# Patient Record
Sex: MALE | Race: WHITE | Employment: FULL TIME | URBAN - METROPOLITAN AREA
[De-identification: names, ages, dates, MRNs, and addresses within clinical notes are randomized per-mention and may not be internally consistent; named-entity substitution may affect disease eponyms.]

---

## 2023-01-15 ENCOUNTER — HOSPITAL ENCOUNTER (EMERGENCY)
Age: 23
Discharge: HOME | End: 2023-01-15
Payer: COMMERCIAL

## 2023-01-15 VITALS
BODY MASS INDEX: 21.67 KG/M2 | RESPIRATION RATE: 16 BRPM | TEMPERATURE: 98.1 F | WEIGHT: 160 LBS | HEIGHT: 72 IN | DIASTOLIC BLOOD PRESSURE: 78 MMHG | HEART RATE: 81 BPM | OXYGEN SATURATION: 98 % | SYSTOLIC BLOOD PRESSURE: 114 MMHG

## 2023-01-15 DIAGNOSIS — R11.2 NAUSEA AND VOMITING, UNSPECIFIED VOMITING TYPE: Primary | ICD-10-CM

## 2023-01-15 LAB
A/G RATIO: 1.6 (ref 1.1–2.2)
ALBUMIN SERPL-MCNC: 4.9 G/DL (ref 3.4–5)
ALP BLD-CCNC: 75 U/L (ref 40–129)
ALT SERPL-CCNC: 39 U/L (ref 10–40)
ANION GAP SERPL CALCULATED.3IONS-SCNC: 9 MMOL/L (ref 3–16)
AST SERPL-CCNC: 30 U/L (ref 15–37)
BASOPHILS ABSOLUTE: 0 K/UL (ref 0–0.2)
BASOPHILS RELATIVE PERCENT: 0.2 %
BILIRUB SERPL-MCNC: 0.9 MG/DL (ref 0–1)
BILIRUBIN URINE: NEGATIVE
BLOOD, URINE: NEGATIVE
BUN BLDV-MCNC: 19 MG/DL (ref 7–20)
CALCIUM SERPL-MCNC: 9.1 MG/DL (ref 8.3–10.6)
CHLORIDE BLD-SCNC: 103 MMOL/L (ref 99–110)
CLARITY: CLEAR
CO2: 25 MMOL/L (ref 21–32)
COLOR: YELLOW
CREAT SERPL-MCNC: 0.8 MG/DL (ref 0.9–1.3)
EOSINOPHILS ABSOLUTE: 0.2 K/UL (ref 0–0.6)
EOSINOPHILS RELATIVE PERCENT: 1.8 %
GFR SERPL CREATININE-BSD FRML MDRD: >60 ML/MIN/{1.73_M2}
GLUCOSE BLD-MCNC: 121 MG/DL (ref 70–99)
GLUCOSE URINE: NEGATIVE MG/DL
HCT VFR BLD CALC: 47.8 % (ref 40.5–52.5)
HEMOGLOBIN: 15.6 G/DL (ref 13.5–17.5)
KETONES, URINE: NEGATIVE MG/DL
LEUKOCYTE ESTERASE, URINE: NEGATIVE
LIPASE: 18 U/L (ref 13–60)
LYMPHOCYTES ABSOLUTE: 0.6 K/UL (ref 1–5.1)
LYMPHOCYTES RELATIVE PERCENT: 4.1 %
MCH RBC QN AUTO: 30 PG (ref 26–34)
MCHC RBC AUTO-ENTMCNC: 32.7 G/DL (ref 31–36)
MCV RBC AUTO: 91.7 FL (ref 80–100)
MICROSCOPIC EXAMINATION: NORMAL
MONOCYTES ABSOLUTE: 0.5 K/UL (ref 0–1.3)
MONOCYTES RELATIVE PERCENT: 4 %
NEUTROPHILS ABSOLUTE: 12.2 K/UL (ref 1.7–7.7)
NEUTROPHILS RELATIVE PERCENT: 89.9 %
NITRITE, URINE: NEGATIVE
PDW BLD-RTO: 13.4 % (ref 12.4–15.4)
PH UA: 6.5 (ref 5–8)
PLATELET # BLD: 122 K/UL (ref 135–450)
PMV BLD AUTO: 8.9 FL (ref 5–10.5)
POTASSIUM REFLEX MAGNESIUM: 4.1 MMOL/L (ref 3.5–5.1)
PROTEIN UA: NEGATIVE MG/DL
RBC # BLD: 5.21 M/UL (ref 4.2–5.9)
RBC UA: NORMAL /HPF (ref 0–4)
SODIUM BLD-SCNC: 137 MMOL/L (ref 136–145)
SPECIFIC GRAVITY UA: 1.02 (ref 1–1.03)
TOTAL PROTEIN: 7.9 G/DL (ref 6.4–8.2)
URINE TYPE: NORMAL
UROBILINOGEN, URINE: 0.2 E.U./DL
WBC # BLD: 13.6 K/UL (ref 4–11)
WBC UA: NORMAL /HPF (ref 0–5)

## 2023-01-15 PROCEDURE — 96374 THER/PROPH/DIAG INJ IV PUSH: CPT

## 2023-01-15 PROCEDURE — 83690 ASSAY OF LIPASE: CPT

## 2023-01-15 PROCEDURE — 2580000003 HC RX 258: Performed by: NURSE PRACTITIONER

## 2023-01-15 PROCEDURE — 85025 COMPLETE CBC W/AUTO DIFF WBC: CPT

## 2023-01-15 PROCEDURE — 36415 COLL VENOUS BLD VENIPUNCTURE: CPT

## 2023-01-15 PROCEDURE — 6360000002 HC RX W HCPCS: Performed by: NURSE PRACTITIONER

## 2023-01-15 PROCEDURE — 99284 EMERGENCY DEPT VISIT MOD MDM: CPT

## 2023-01-15 PROCEDURE — 81001 URINALYSIS AUTO W/SCOPE: CPT

## 2023-01-15 PROCEDURE — 80053 COMPREHEN METABOLIC PANEL: CPT

## 2023-01-15 RX ORDER — ONDANSETRON 2 MG/ML
4 INJECTION INTRAMUSCULAR; INTRAVENOUS EVERY 6 HOURS PRN
Status: DISCONTINUED | OUTPATIENT
Start: 2023-01-15 | End: 2023-01-15 | Stop reason: HOSPADM

## 2023-01-15 RX ORDER — 0.9 % SODIUM CHLORIDE 0.9 %
1000 INTRAVENOUS SOLUTION INTRAVENOUS ONCE
Status: COMPLETED | OUTPATIENT
Start: 2023-01-15 | End: 2023-01-15

## 2023-01-15 RX ORDER — SODIUM CHLORIDE 9 MG/ML
1000 INJECTION, SOLUTION INTRAVENOUS CONTINUOUS
Status: DISCONTINUED | OUTPATIENT
Start: 2023-01-15 | End: 2023-01-15

## 2023-01-15 RX ORDER — ONDANSETRON 4 MG/1
4 TABLET, FILM COATED ORAL EVERY 8 HOURS PRN
Qty: 6 TABLET | Refills: 0 | Status: SHIPPED | OUTPATIENT
Start: 2023-01-15 | End: 2023-01-17

## 2023-01-15 RX ADMIN — ONDANSETRON HYDROCHLORIDE 4 MG: 2 INJECTION, SOLUTION INTRAMUSCULAR; INTRAVENOUS at 10:45

## 2023-01-15 RX ADMIN — SODIUM CHLORIDE 1000 ML: 9 INJECTION, SOLUTION INTRAVENOUS at 10:46

## 2023-01-15 ASSESSMENT — PAIN SCALES - GENERAL
PAINLEVEL_OUTOF10: 3
PAINLEVEL_OUTOF10: 3
PAINLEVEL_OUTOF10: 4
PAINLEVEL_OUTOF10: 1

## 2023-01-15 ASSESSMENT — ENCOUNTER SYMPTOMS
SORE THROAT: 0
NAUSEA: 1
COUGH: 0
BLOOD IN STOOL: 0
BACK PAIN: 0
RHINORRHEA: 0
SHORTNESS OF BREATH: 0
ABDOMINAL PAIN: 0
VOMITING: 1
EYE PAIN: 0
DIARRHEA: 1

## 2023-01-15 ASSESSMENT — PAIN DESCRIPTION - LOCATION: LOCATION: ABDOMEN

## 2023-01-15 ASSESSMENT — PAIN DESCRIPTION - ORIENTATION: ORIENTATION: MID

## 2023-01-15 NOTE — ED PROVIDER NOTES
[address] ED  EMERGENCY DEPARTMENT ENCOUNTER        Pt Name: Manny Brothers  MRN: [id number]  [place] 2000  Date of evaluation: 1/15/2023  Provider: JUSTICE Rivers CNP  PCP: No primary care provider on file. Note Started: 10:38 AM EST 1/15/23      MARK. I have evaluated this patient. My supervising physician was available for consultation. CHIEF COMPLAINT       Chief Complaint   Patient presents with    Emesis     Patient reports vomiting and diarrhea since 7am this morning. Patient believes he has food poisoning from pizza he ate last night. HISTORY OF PRESENT ILLNESS: 1 or more Elements     History From: Patient  Limitations to history : None    Manny Brothers is a 25 y.o. male who presents to the emergency department symptoms of nausea and vomiting. Patient reported symptoms of nausea vomiting began this morning around 7 AM.  Patient states that symptoms of vomiting of been reported as initial vomiting of food like symptoms is now just dry heaves. Upon arrival to the emergency department IV was placed and he was provided a single dose of Zofran. Patient has not vomited since his single dose of Zofran. We will continue to monitor. He has no symptoms of neck pain or back pain. No abdominal pain. Denies any dysuria. He reported an episode of diarrhea. Nursing Notes were all reviewed and agreed with or any disagreements were addressed in the HPI. REVIEW OF SYSTEMS :      Review of Systems   Constitutional:  Negative for chills, diaphoresis and fever. HENT:  Negative for congestion, ear pain, rhinorrhea and sore throat. Eyes:  Negative for pain and visual disturbance. Respiratory:  Negative for cough and shortness of breath. Cardiovascular:  Negative for chest pain and leg swelling. Gastrointestinal:  Positive for diarrhea, nausea and vomiting. Negative for abdominal pain and blood in stool.    Genitourinary:  Negative for difficulty urinating, dysuria, flank pain and frequency. Musculoskeletal:  Negative for back pain and neck pain. Skin:  Negative for rash and wound. Neurological:  Negative for dizziness and light-headedness. Positives and Pertinent negatives as per HPI. SURGICAL HISTORY   History reviewed. No pertinent surgical history. CURRENTMEDICATIONS       Previous Medications    No medications on file       ALLERGIES     Peanut-containing drug products    FAMILYHISTORY     History reviewed. No pertinent family history. SOCIAL HISTORY       Social History     Tobacco Use    Smoking status: Never   Substance Use Topics    Alcohol use: Yes     Comment: biweekly    Drug use: Yes     Types: Marijuana (Weed)     Comment: occasional       SCREENINGS        Ra Coma Scale  Eye Opening: Spontaneous  Best Verbal Response: None  Best Motor Response: Obeys commands  Ra Coma Scale Score: 15                CIWA Assessment  BP: 124/81  Heart Rate: 87           PHYSICAL EXAM  1 or more Elements     ED Triage Vitals   BP Temp Temp src Heart Rate Resp SpO2 Height Weight   01/15/23 0956 -- -- 01/15/23 0956 01/15/23 0959 01/15/23 0956 01/15/23 0956 01/15/23 0956   124/81   87 18 97 % 6' (1.829 m) 160 lb (72.6 kg)       Physical Exam  Vitals and nursing note reviewed. Constitutional:       Appearance: Normal appearance. He is not toxic-appearing or diaphoretic. HENT:      Head: Normocephalic and atraumatic. Nose: Nose normal.   Eyes:      General:         Right eye: No discharge. Left eye: No discharge. Cardiovascular:      Rate and Rhythm: Normal rate and regular rhythm. Pulses: Normal pulses. Heart sounds: No murmur heard. Pulmonary:      Effort: Pulmonary effort is normal. No respiratory distress. Breath sounds: No wheezing or rhonchi. Abdominal:      Palpations: Abdomen is soft. Tenderness: There is no abdominal tenderness. There is no guarding or rebound.    Musculoskeletal: General: Normal range of motion.      Cervical back: Normal range of motion and neck supple.      Right lower leg: No edema.      Left lower leg: No edema.   Skin:     General: Skin is warm and dry.   Neurological:      General: No focal deficit present.      Mental Status: He is alert and oriented to person, place, and time.   Psychiatric:         Mood and Affect: Mood normal.         Behavior: Behavior normal.           DIAGNOSTIC RESULTS   LABS:    Labs Reviewed   CBC WITH AUTO DIFFERENTIAL - Abnormal; Notable for the following components:       Result Value    WBC 13.6 (*)     Platelets 122 (*)     Neutrophils Absolute 12.2 (*)     Lymphocytes Absolute 0.6 (*)     All other components within normal limits   COMPREHENSIVE METABOLIC PANEL W/ REFLEX TO MG FOR LOW K - Abnormal; Notable for the following components:    Glucose 121 (*)     Creatinine 0.8 (*)     All other components within normal limits   LIPASE   URINALYSIS WITH MICROSCOPIC       When ordered only abnormal lab results are displayed. All other labs were within normal range or not returned as of this dictation.    EKG: When ordered, EKG's are interpreted by the Emergency Department Physician in the absence of a cardiologist.  Please see their note for interpretation of EKG.    RADIOLOGY:   Non-plain film images such as CT, Ultrasound and MRI are read by the radiologist. Plain radiographic images are visualized and preliminarily interpreted by the ED Provider with the below findings:        Interpretation per the Radiologist below, if available at the time of this note:    No orders to display     No results found.    No results found.    PROCEDURES   Unless otherwise noted below, none     Procedures    CRITICAL CARE TIME (.cctime)       PAST MEDICAL HISTORY      has no past medical history on file.     EMERGENCY DEPARTMENT COURSE and DIFFERENTIAL DIAGNOSIS/MDM:   Vitals:    Vitals:    01/15/23 0956 01/15/23 0959   BP: 124/81    Pulse: 87    Resp:  18  SpO2: 97%    Weight: 160 lb (72.6 kg)    Height: 6' (1.829 m)        Patient was given the following medications:  Medications   ondansetron (ZOFRAN) injection 4 mg (4 mg IntraVENous Given 1/15/23 1045)   0.9 % sodium chloride bolus (0 mLs IntraVENous Stopped 1/15/23 1138)             Is this patient to be included in the SEP-1 Core Measure due to severe sepsis or septic shock? No   Exclusion criteria - the patient is NOT to be included for SEP-1 Core Measure due to:  2+ SIRS criteria are not met    Chronic Conditions affecting care:    has no past medical history on file. CONSULTS: (Who and What was discussed)  None      Social Determinants : None    Records Reviewed (Source):     CC/HPI Summary, DDx, ED Course, and Reassessment: At this time patient well-appearing here in the emergency department. We will go and plan for discharge. Patient provided Zofran for home use. Patient's had improvement in symptoms with Zofran. In the meantime patient's general abdominal exam is benign. No tenderness on palpation. Was also noted that his general laboratory findings were quite unremarkable. Minimal elevation in his white blood cell count but otherwise normal-appearing. Patient be discharged home condition. Disposition Considerations (tests considered but not done, Admit vs D/C, Shared Decision Making, Pt Expectation of Test or Tx.): Patient be discharged home in good condition. Patient appears well nontoxic. No acute complaints noted at this time. Encouraged return follow-up to the ER if he has any concerning symptoms. Also advised to follow-up family doctor next few days. I am the Primary Clinician of Record. FINAL IMPRESSION      1.  Nausea and vomiting, unspecified vomiting type          DISPOSITION/PLAN     DISPOSITION Discharge - Pending Orders Complete 01/15/2023 12:12:36 PM      PAT[facility]:  [place] [facility]  [address] [facility]  Go to If symptoms worsen    DISCHARGE MEDICATIONS:  New Prescriptions    ONDANSETRON (ZOFRAN) 4 MG TABLET    Take 1 tablet by mouth every 8 hours as needed for Nausea       DISCONTINUED MEDICATIONS:  Discontinued Medications    No medications on file              (Please note that portions of this note were completed with a voice recognition program.  Efforts were made to edit the dictations but occasionally words are mis-transcribed.)    JUSTICE Cruz CNP (electronically signed)       JUSTICE Cruz CNP  01/15/23 7841

## 2023-01-15 NOTE — Clinical Note
Maximus Solitario was seen and treated in our emergency department on 1/15/2023. He may return to work on 01/17/2023. If you have any questions or concerns, please don't hesitate to call.       Indira Mojica, JUSTICE - CNP

## 2023-01-15 NOTE — Clinical Note
Thalia Pierson was seen and treated in our emergency department on 1/15/2023. He may return to work on 01/17/2023.  ? If you have any questions or concerns, please don't hesitate to call.       Mikhail Davis, APRN - CNP

## 2023-01-17 ENCOUNTER — APPOINTMENT (OUTPATIENT)
Dept: GENERAL RADIOLOGY | Age: 23
End: 2023-01-17
Payer: COMMERCIAL

## 2023-01-17 ENCOUNTER — HOSPITAL ENCOUNTER (EMERGENCY)
Age: 23
Discharge: HOME | End: 2023-01-17
Payer: COMMERCIAL

## 2023-01-17 VITALS
HEIGHT: 72 IN | DIASTOLIC BLOOD PRESSURE: 70 MMHG | SYSTOLIC BLOOD PRESSURE: 118 MMHG | TEMPERATURE: 97.8 F | HEART RATE: 74 BPM | BODY MASS INDEX: 21.67 KG/M2 | OXYGEN SATURATION: 98 % | WEIGHT: 160 LBS | RESPIRATION RATE: 17 BRPM

## 2023-01-17 DIAGNOSIS — R07.89 CHEST WALL PAIN: Primary | ICD-10-CM

## 2023-01-17 LAB
A/G RATIO: 1.6 (ref 1.1–2.2)
ALBUMIN SERPL-MCNC: 4.4 G/DL (ref 3.4–5)
ALP BLD-CCNC: 75 U/L (ref 40–129)
ALT SERPL-CCNC: 30 U/L (ref 10–40)
ANION GAP SERPL CALCULATED.3IONS-SCNC: 7 MMOL/L (ref 3–16)
AST SERPL-CCNC: 24 U/L (ref 15–37)
BASOPHILS ABSOLUTE: 0 K/UL (ref 0–0.2)
BASOPHILS RELATIVE PERCENT: 0.3 %
BILIRUB SERPL-MCNC: 0.5 MG/DL (ref 0–1)
BUN BLDV-MCNC: 15 MG/DL (ref 7–20)
CALCIUM SERPL-MCNC: 8.7 MG/DL (ref 8.3–10.6)
CHLORIDE BLD-SCNC: 105 MMOL/L (ref 99–110)
CO2: 29 MMOL/L (ref 21–32)
CREAT SERPL-MCNC: 0.9 MG/DL (ref 0.9–1.3)
EKG ATRIAL RATE: 60 BPM
EKG DIAGNOSIS: NORMAL
EKG P AXIS: 67 DEGREES
EKG P-R INTERVAL: 160 MS
EKG Q-T INTERVAL: 378 MS
EKG QRS DURATION: 82 MS
EKG QTC CALCULATION (BAZETT): 378 MS
EKG R AXIS: 57 DEGREES
EKG T AXIS: 40 DEGREES
EKG VENTRICULAR RATE: 60 BPM
EOSINOPHILS ABSOLUTE: 0.2 K/UL (ref 0–0.6)
EOSINOPHILS RELATIVE PERCENT: 4.7 %
GFR SERPL CREATININE-BSD FRML MDRD: >60 ML/MIN/{1.73_M2}
GLUCOSE BLD-MCNC: 99 MG/DL (ref 70–99)
HCT VFR BLD CALC: 43.2 % (ref 40.5–52.5)
HEMOGLOBIN: 14.7 G/DL (ref 13.5–17.5)
LYMPHOCYTES ABSOLUTE: 1 K/UL (ref 1–5.1)
LYMPHOCYTES RELATIVE PERCENT: 21.1 %
MCH RBC QN AUTO: 30.6 PG (ref 26–34)
MCHC RBC AUTO-ENTMCNC: 34 G/DL (ref 31–36)
MCV RBC AUTO: 89.9 FL (ref 80–100)
MONOCYTES ABSOLUTE: 0.6 K/UL (ref 0–1.3)
MONOCYTES RELATIVE PERCENT: 12.7 %
NEUTROPHILS ABSOLUTE: 3 K/UL (ref 1.7–7.7)
NEUTROPHILS RELATIVE PERCENT: 61.2 %
PDW BLD-RTO: 13.2 % (ref 12.4–15.4)
PLATELET # BLD: 119 K/UL (ref 135–450)
PMV BLD AUTO: 8.7 FL (ref 5–10.5)
POTASSIUM REFLEX MAGNESIUM: 4 MMOL/L (ref 3.5–5.1)
RBC # BLD: 4.8 M/UL (ref 4.2–5.9)
SODIUM BLD-SCNC: 141 MMOL/L (ref 136–145)
TOTAL PROTEIN: 7.2 G/DL (ref 6.4–8.2)
TROPONIN: <0.01 NG/ML
WBC # BLD: 4.8 K/UL (ref 4–11)

## 2023-01-17 PROCEDURE — 93005 ELECTROCARDIOGRAM TRACING: CPT | Performed by: EMERGENCY MEDICINE

## 2023-01-17 PROCEDURE — 36415 COLL VENOUS BLD VENIPUNCTURE: CPT

## 2023-01-17 PROCEDURE — 71045 X-RAY EXAM CHEST 1 VIEW: CPT

## 2023-01-17 PROCEDURE — 84484 ASSAY OF TROPONIN QUANT: CPT

## 2023-01-17 PROCEDURE — 85025 COMPLETE CBC W/AUTO DIFF WBC: CPT

## 2023-01-17 PROCEDURE — 93010 ELECTROCARDIOGRAM REPORT: CPT | Performed by: INTERNAL MEDICINE

## 2023-01-17 PROCEDURE — 80053 COMPREHEN METABOLIC PANEL: CPT

## 2023-01-17 PROCEDURE — 96374 THER/PROPH/DIAG INJ IV PUSH: CPT

## 2023-01-17 PROCEDURE — 6360000002 HC RX W HCPCS: Performed by: NURSE PRACTITIONER

## 2023-01-17 PROCEDURE — 99285 EMERGENCY DEPT VISIT HI MDM: CPT

## 2023-01-17 RX ORDER — KETOROLAC TROMETHAMINE 30 MG/ML
15 INJECTION, SOLUTION INTRAMUSCULAR; INTRAVENOUS ONCE
Status: COMPLETED | OUTPATIENT
Start: 2023-01-17 | End: 2023-01-17

## 2023-01-17 RX ADMIN — KETOROLAC TROMETHAMINE 15 MG: 30 INJECTION, SOLUTION INTRAMUSCULAR; INTRAVENOUS at 10:02

## 2023-01-17 ASSESSMENT — ENCOUNTER SYMPTOMS
SHORTNESS OF BREATH: 0
NAUSEA: 0
ABDOMINAL PAIN: 0
BACK PAIN: 0
SORE THROAT: 0
EYE PAIN: 0
COUGH: 0
DIARRHEA: 0
BLOOD IN STOOL: 0
VOMITING: 0
RHINORRHEA: 0

## 2023-01-17 ASSESSMENT — PAIN DESCRIPTION - DESCRIPTORS: DESCRIPTORS: BURNING

## 2023-01-17 ASSESSMENT — PAIN - FUNCTIONAL ASSESSMENT: PAIN_FUNCTIONAL_ASSESSMENT: 0-10

## 2023-01-17 ASSESSMENT — LIFESTYLE VARIABLES
HOW MANY STANDARD DRINKS CONTAINING ALCOHOL DO YOU HAVE ON A TYPICAL DAY: 1 OR 2
HOW OFTEN DO YOU HAVE A DRINK CONTAINING ALCOHOL: MONTHLY OR LESS

## 2023-01-17 ASSESSMENT — PAIN SCALES - GENERAL: PAINLEVEL_OUTOF10: 7

## 2023-01-17 ASSESSMENT — PAIN DESCRIPTION - LOCATION: LOCATION: CHEST

## 2023-01-17 ASSESSMENT — HEART SCORE: ECG: 0

## 2023-01-17 NOTE — Clinical Note
Andrew Russo was seen and treated in our emergency department on 1/17/2023. He may return to work on 01/19/2023. If you have any questions or concerns, please don't hesitate to call.       Jenaro Harper, JUSTICE - CNP

## 2023-01-17 NOTE — ED PROVIDER NOTES
[address] ED  EMERGENCY DEPARTMENT ENCOUNTER        Pt Name: Cori Michelle  MRN: [id number]  [place] 2000  Date of evaluation: 1/17/2023  Provider: JUSTICE Blackman CNP  PCP: No primary care provider on file. Note Started: 9:11 AM EST 1/17/23      MARK. I have evaluated this patient. My supervising physician was available for consultation. CHIEF COMPLAINT       Chief Complaint   Patient presents with    Chest Pain     Left upper burning feeling, \"I bought a vape pen yesterday and hit it a lot. \"       HISTORY OF PRESENT ILLNESS: 1 or more Elements     History From: Patient  Limitations to history : None    Cori Michelle is a 25 y.o. male who presents to the emergency department with symptoms of burning in his chest.  Patient states that he bought a vape pen yesterday and had apparently smoked quite a bit. He states that he since then has had some burning in his chest and reported pain with range of motion of his chest wall including range of motion of his left shoulder. He states that whenever he stretches his left arm it causes some tenderness in the chest wall. No symptoms of fever or chills. No nausea or vomiting. No neck pain or back pain. No abdominal pain. Denies cough. Denies any cardiac history. States he was seen here few days ago and was treated for nausea vomiting which is since subsided. He denies any abdominal pain. States yesterday felt well without any nausea or vomiting. No other acute concerns. Nursing Notes were all reviewed and agreed with or any disagreements were addressed in the HPI. REVIEW OF SYSTEMS :      Review of Systems   Constitutional:  Negative for chills, diaphoresis and fever. HENT:  Negative for congestion, ear pain, rhinorrhea and sore throat. Eyes:  Negative for pain and visual disturbance. Respiratory:  Negative for cough and shortness of breath. Cardiovascular:  Positive for chest pain.  Negative for leg swelling. Gastrointestinal:  Negative for abdominal pain, blood in stool, diarrhea, nausea and vomiting. Genitourinary:  Negative for difficulty urinating, dysuria, flank pain and frequency. Musculoskeletal:  Negative for back pain and neck pain. Skin:  Negative for rash and wound. Neurological:  Negative for dizziness and light-headedness. Positives and Pertinent negatives as per HPI. SURGICAL HISTORY   History reviewed. No pertinent surgical history. CURRENTMEDICATIONS       Previous Medications    No medications on file       ALLERGIES     Iodine and Peanut-containing drug products    FAMILYHISTORY     History reviewed. No pertinent family history. SOCIAL HISTORY       Social History     Tobacco Use    Smoking status: Never    Smokeless tobacco: Current   Vaping Use    Vaping Use: Some days    Start date: 1/17/2023    Substances: Nicotine   Substance Use Topics    Alcohol use: Not Currently     Comment: biweekly    Drug use: Yes     Types: Marijuana Fronie Bessie)     Comment: occasional       SCREENINGS        Ra Coma Scale  Eye Opening: Spontaneous  Best Verbal Response: Oriented  Best Motor Response: Obeys commands  Ra Coma Scale Score: 15        Heart Score for chest pain patients  History: Slightly Suspicious  ECG: Normal  Patient Age: < 45 years  Risk Factors: No risk factors known  Troponin: < 1X normal limit  Heart Score Total: 0       CIWA Assessment  BP: 124/72  Heart Rate: 70           PHYSICAL EXAM  1 or more Elements     ED Triage Vitals [01/17/23 0851]   BP Temp Temp Source Heart Rate Resp SpO2 Height Weight   124/72 97.8 °F (36.6 °C) Oral 70 16 98 % 6' (1.829 m) 160 lb (72.6 kg)       Physical Exam  Vitals and nursing note reviewed. Constitutional:       Appearance: Normal appearance. He is not toxic-appearing or diaphoretic. HENT:      Head: Normocephalic and atraumatic. Nose: Nose normal.   Eyes:      General:         Right eye: No discharge.          Left eye: No discharge. Cardiovascular:      Rate and Rhythm: Normal rate and regular rhythm. Pulses: Normal pulses. Heart sounds: No murmur heard. Pulmonary:      Effort: Pulmonary effort is normal. No respiratory distress. Breath sounds: No wheezing or rhonchi. Chest:      Chest wall: Tenderness present. No lacerations, deformity, swelling or crepitus. Comments: Tender to palpation of the left upper chest wall. Abdominal:      Palpations: Abdomen is soft. Tenderness: There is no abdominal tenderness. There is no guarding or rebound. Musculoskeletal:         General: Normal range of motion. Cervical back: Normal range of motion and neck supple. Skin:     General: Skin is warm and dry. Neurological:      General: No focal deficit present. Mental Status: He is alert and oriented to person, place, and time. Psychiatric:         Mood and Affect: Mood normal.         Behavior: Behavior normal.           DIAGNOSTIC RESULTS   LABS:    Labs Reviewed   CBC WITH AUTO DIFFERENTIAL - Abnormal; Notable for the following components:       Result Value    Platelets 594 (*)     All other components within normal limits   COMPREHENSIVE METABOLIC PANEL W/ REFLEX TO MG FOR LOW K   TROPONIN       When ordered only abnormal lab results are displayed. All other labs were within normal range or not returned as of this dictation. EKG: When ordered, EKG's are interpreted by the Emergency Department Physician in the absence of a cardiologist.  Please see their note for interpretation of EKG. RADIOLOGY:   Non-plain film images such as CT, Ultrasound and MRI are read by the radiologist. Plain radiographic images are visualized and preliminarily interpreted by the ED Provider with the below findings:    Chest x-ray interpreted by me without any evidence of acute abnormality. As noted the patient has no evidence of pneumonia, pneumothorax, cardiomegaly.     Interpretation per the Radiologist below, if available at the time of this note:    XR CHEST PORTABLE   Final Result   No radiographic evidence of acute pulmonary disease. No results found. No results found. PROCEDURES   Unless otherwise noted below, none     Procedures    CRITICAL CARE TIME (.cctime)       PAST MEDICAL HISTORY      has no past medical history on file. EMERGENCY DEPARTMENT COURSE and DIFFERENTIAL DIAGNOSIS/MDM:   Vitals:    Vitals:    01/17/23 0851   BP: 124/72   Pulse: 70   Resp: 16   Temp: 97.8 °F (36.6 °C)   TempSrc: Oral   SpO2: 98%   Weight: 160 lb (72.6 kg)   Height: 6' (1.829 m)       Patient was given the following medications:  Medications   ketorolac (TORADOL) injection 15 mg (has no administration in time range)             Is this patient to be included in the SEP-1 Core Measure due to severe sepsis or septic shock? No   Exclusion criteria - the patient is NOT to be included for SEP-1 Core Measure due to: Infection is not suspected    Chronic Conditions affecting care:    has no past medical history on file. CONSULTS: (Who and What was discussed)  None      Social Determinants : None    Records Reviewed (Source):     CC/HPI Summary, DDx, ED Course, and Reassessment: Patient be discharged home good condition. Patient has a heart score of 0. Patient's examination in the emergency department displays evidence of tenderness to palpation of the chest wall. Low special for PE. Wells criteria of low suspicion for PE as well as PERC negative. At this time likely musculoskeletal chest wall pain. In the meantime we will go and plan for discharge. Chest x-ray was unremarkable, symptoms improved with Toradol IV. General laboratory findings were unremarkable providing does have a very mild low thrombocytopenia platelet count of 604. BUN/creatinine normal.  Transaminases normal.  Bilirubin normal.  The meantime again plan for discharge.   Educated the patient on vape use and the need for vaping cessation. Disposition Considerations (tests considered but not done, Admit vs D/C, Shared Decision Making, Pt Expectation of Test or Tx.): Patient was encouraged to follow-up family doctor next few days. Also advised to return back to the ER for any further concerns. Patient verbalized understanding agrees. Discharged home good condition. I am the Primary Clinician of Record. FINAL IMPRESSION      1.  Chest wall pain          DISPOSITION/PLAN     DISPOSITION Discharge - Pending Orders Complete 01/17/2023 09:54:37 AM      PATIENT REFERRED [facility]:  [place] [facility] [place] [facility]  [address] [facility]  Go to   If symptoms worsen    [facility]) Pre-Services  [phone]        DISCHARGE MEDICATIONS:  New Prescriptions    No medications on file       DISCONTINUED MEDICATIONS:  Discontinued Medications    ONDANSETRON (ZOFRAN) 4 MG TABLET    Take 1 tablet by mouth every 8 hours as needed for Nausea              (Please note that portions of this note were completed with a voice recognition program.  Efforts were made to edit the dictations but occasionally words are mis-transcribed.)    JUSTICE Ross CNP (electronically signed)       JUSTICE Ross CNP  01/17/23 8106

## 2024-04-24 ENCOUNTER — APPOINTMENT (OUTPATIENT)
Dept: GENERAL RADIOLOGY | Age: 24
End: 2024-04-24
Payer: COMMERCIAL

## 2024-04-24 ENCOUNTER — HOSPITAL ENCOUNTER (EMERGENCY)
Age: 24
Discharge: HOME | End: 2024-04-24
Attending: STUDENT IN AN ORGANIZED HEALTH CARE EDUCATION/TRAINING PROGRAM
Payer: COMMERCIAL

## 2024-04-24 VITALS
HEART RATE: 60 BPM | DIASTOLIC BLOOD PRESSURE: 66 MMHG | BODY MASS INDEX: 25.44 KG/M2 | HEIGHT: 72 IN | OXYGEN SATURATION: 98 % | RESPIRATION RATE: 17 BRPM | WEIGHT: 187.8 LBS | TEMPERATURE: 98 F | SYSTOLIC BLOOD PRESSURE: 111 MMHG

## 2024-04-24 DIAGNOSIS — K08.89 PAIN, DENTAL: ICD-10-CM

## 2024-04-24 DIAGNOSIS — F41.1 ANXIETY STATE: ICD-10-CM

## 2024-04-24 DIAGNOSIS — R07.9 CHEST PAIN, UNSPECIFIED TYPE: Primary | ICD-10-CM

## 2024-04-24 LAB
ALBUMIN SERPL-MCNC: 4.5 G/DL (ref 3.4–5)
ALBUMIN/GLOB SERPL: 2 {RATIO} (ref 1.1–2.2)
ALP SERPL-CCNC: 84 U/L (ref 40–129)
ALT SERPL-CCNC: 25 U/L (ref 10–40)
ANION GAP SERPL CALCULATED.3IONS-SCNC: 12 MMOL/L (ref 3–16)
AST SERPL-CCNC: 19 U/L (ref 15–37)
BASOPHILS # BLD: 0 K/UL (ref 0–0.2)
BASOPHILS NFR BLD: 0.4 %
BILIRUB SERPL-MCNC: 0.4 MG/DL (ref 0–1)
BUN SERPL-MCNC: 16 MG/DL (ref 7–20)
CALCIUM SERPL-MCNC: 9.5 MG/DL (ref 8.3–10.6)
CHLORIDE SERPL-SCNC: 99 MMOL/L (ref 99–110)
CO2 SERPL-SCNC: 26 MMOL/L (ref 21–32)
CREAT SERPL-MCNC: 1.1 MG/DL (ref 0.9–1.3)
DEPRECATED RDW RBC AUTO: 12.8 % (ref 12.4–15.4)
EOSINOPHIL # BLD: 0.5 K/UL (ref 0–0.6)
EOSINOPHIL NFR BLD: 6.7 %
FLUAV RNA RESP QL NAA+PROBE: NOT DETECTED
FLUBV RNA RESP QL NAA+PROBE: NOT DETECTED
GFR SERPLBLD CREATININE-BSD FMLA CKD-EPI: >90 ML/MIN/{1.73_M2}
GLUCOSE SERPL-MCNC: 91 MG/DL (ref 70–99)
HCT VFR BLD AUTO: 43.4 % (ref 40.5–52.5)
HGB BLD-MCNC: 14.9 G/DL (ref 13.5–17.5)
LYMPHOCYTES # BLD: 2.8 K/UL (ref 1–5.1)
LYMPHOCYTES NFR BLD: 33.7 %
MCH RBC QN AUTO: 30.8 PG (ref 26–34)
MCHC RBC AUTO-ENTMCNC: 34.3 G/DL (ref 31–36)
MCV RBC AUTO: 89.8 FL (ref 80–100)
MONOCYTES # BLD: 0.6 K/UL (ref 0–1.3)
MONOCYTES NFR BLD: 7.8 %
NEUTROPHILS # BLD: 4.2 K/UL (ref 1.7–7.7)
NEUTROPHILS NFR BLD: 51.4 %
PLATELET # BLD AUTO: 126 K/UL (ref 135–450)
PMV BLD AUTO: 8.9 FL (ref 5–10.5)
POTASSIUM SERPL-SCNC: 3.8 MMOL/L (ref 3.5–5.1)
PROT SERPL-MCNC: 6.8 G/DL (ref 6.4–8.2)
RBC # BLD AUTO: 4.83 M/UL (ref 4.2–5.9)
SARS-COV-2 RNA RESP QL NAA+PROBE: NOT DETECTED
SODIUM SERPL-SCNC: 137 MMOL/L (ref 136–145)
TROPONIN, HIGH SENSITIVITY: 6 NG/L (ref 0–22)
TROPONIN, HIGH SENSITIVITY: 9 NG/L (ref 0–22)
WBC # BLD AUTO: 8.2 K/UL (ref 4–11)

## 2024-04-24 PROCEDURE — 71045 X-RAY EXAM CHEST 1 VIEW: CPT

## 2024-04-24 PROCEDURE — 80053 COMPREHEN METABOLIC PANEL: CPT

## 2024-04-24 PROCEDURE — 87636 SARSCOV2 & INF A&B AMP PRB: CPT

## 2024-04-24 PROCEDURE — 36415 COLL VENOUS BLD VENIPUNCTURE: CPT

## 2024-04-24 PROCEDURE — 93005 ELECTROCARDIOGRAM TRACING: CPT | Performed by: STUDENT IN AN ORGANIZED HEALTH CARE EDUCATION/TRAINING PROGRAM

## 2024-04-24 PROCEDURE — 99285 EMERGENCY DEPT VISIT HI MDM: CPT

## 2024-04-24 PROCEDURE — 84484 ASSAY OF TROPONIN QUANT: CPT

## 2024-04-24 PROCEDURE — 85025 COMPLETE CBC W/AUTO DIFF WBC: CPT

## 2024-04-24 PROCEDURE — 6370000000 HC RX 637 (ALT 250 FOR IP): Performed by: STUDENT IN AN ORGANIZED HEALTH CARE EDUCATION/TRAINING PROGRAM

## 2024-04-24 RX ORDER — AMOXICILLIN AND CLAVULANATE POTASSIUM 875; 125 MG/1; MG/1
1 TABLET, FILM COATED ORAL 2 TIMES DAILY
Qty: 14 TABLET | Refills: 0 | Status: SHIPPED | OUTPATIENT
Start: 2024-04-24 | End: 2024-05-01

## 2024-04-24 RX ORDER — AMOXICILLIN AND CLAVULANATE POTASSIUM 875; 125 MG/1; MG/1
1 TABLET, FILM COATED ORAL 2 TIMES DAILY
Qty: 14 TABLET | Refills: 0 | Status: SHIPPED | OUTPATIENT
Start: 2024-04-24 | End: 2024-04-24

## 2024-04-24 RX ORDER — ACETAMINOPHEN 325 MG/1
650 TABLET ORAL ONCE
Status: COMPLETED | OUTPATIENT
Start: 2024-04-24 | End: 2024-04-24

## 2024-04-24 RX ORDER — HYDROXYZINE HYDROCHLORIDE 25 MG/1
25 TABLET, FILM COATED ORAL EVERY 8 HOURS PRN
Qty: 30 TABLET | Refills: 0 | Status: SHIPPED | OUTPATIENT
Start: 2024-04-24 | End: 2024-04-24

## 2024-04-24 RX ORDER — HYDROXYZINE HYDROCHLORIDE 25 MG/1
25 TABLET, FILM COATED ORAL EVERY 8 HOURS PRN
Qty: 30 TABLET | Refills: 0 | Status: SHIPPED | OUTPATIENT
Start: 2024-04-24 | End: 2024-05-04

## 2024-04-24 RX ADMIN — ACETAMINOPHEN 650 MG: 325 TABLET ORAL at 03:16

## 2024-04-24 ASSESSMENT — LIFESTYLE VARIABLES
HOW OFTEN DO YOU HAVE A DRINK CONTAINING ALCOHOL: NEVER
HOW MANY STANDARD DRINKS CONTAINING ALCOHOL DO YOU HAVE ON A TYPICAL DAY: PATIENT DOES NOT DRINK

## 2024-04-24 ASSESSMENT — PAIN DESCRIPTION - PAIN TYPE: TYPE: ACUTE PAIN

## 2024-04-24 ASSESSMENT — PAIN DESCRIPTION - FREQUENCY: FREQUENCY: INTERMITTENT

## 2024-04-24 ASSESSMENT — PAIN SCALES - GENERAL
PAINLEVEL_OUTOF10: 1
PAINLEVEL_OUTOF10: 4
PAINLEVEL_OUTOF10: 0

## 2024-04-24 ASSESSMENT — PAIN DESCRIPTION - LOCATION
LOCATION: CHEST
LOCATION: HEAD

## 2024-04-24 ASSESSMENT — PAIN DESCRIPTION - DESCRIPTORS
DESCRIPTORS: ACHING
DESCRIPTORS: ACHING

## 2024-04-24 ASSESSMENT — PAIN DESCRIPTION - ONSET: ONSET: ON-GOING

## 2024-04-24 ASSESSMENT — PAIN DESCRIPTION - ORIENTATION
ORIENTATION: MID
ORIENTATION: ANTERIOR

## 2024-04-24 ASSESSMENT — PAIN - FUNCTIONAL ASSESSMENT
PAIN_FUNCTIONAL_ASSESSMENT: 0-10
PAIN_FUNCTIONAL_ASSESSMENT: 0-10
PAIN_FUNCTIONAL_ASSESSMENT: ACTIVITIES ARE NOT PREVENTED

## 2024-04-24 NOTE — ED PROVIDER NOTES
Emergency Department Encounter    Patient: Augustin Mccall  MRN: [id number]  : 2000  Date of Evaluation: 2024  ED Provider:  Jonnie Enriquez MD    Triage Chief Complaint:   Shortness of Breath (Pt arrives ambulatory with c/o SOB that started about a week ago. ), Chest Pain (Pt states he has had mid-sternal intermittent chest pain for approx 2 days.), and Jaw Pain (Pt states jaw pain on left lower and right lower for a month. )    [place]:  Augustin Mccall is a 24 y.o. male presenting with complaints of dental pain for the past month and over the past several days has had intermittent episodes of chest discomfort and shortness of breath.  Patient states he believes it may be anxiety.  Patient states he has had dental pain for the past month.  Mostly over the right molars.  Denies any swelling of the face, neck, neck pain or stiffness, difficulty swallowing or handling secretions.  Denies fevers or chills.  States his pain in his molars is mild, constant, throbbing, worse with palpation without relieving factors.  States that over the past several days he has had intermittent episodes of chest pain.  States it will happen once every several hours and will last about 4 seconds and then resolve.  States when he has this chest pain he will have some mild shortness of breath along with that but states that he is unsure if this is because he becomes anxious with the chest pain.  He states that because he has been having the dental pain he has heard that cardiac issues can cause jaw pain so wanted to come get his heart checked out.  Denies any chest pain or shortness of breath currently.  Denies lightheadedness or dizziness.  Denies history of asthma or COPD.  Denies stridor or wheezing.  Denies nasal congestion, cough, sputum production, fevers or chills.  Denies sore throat, difficulty swallowing or handling secretions.  Denies lower extremity edema, orthopnea or signs of fluid overload.  Denies signs or  symptoms of current DVT, denies history of DVT or blood clot in the past, denies recent trauma or travel.  Denies recent surgeries, hormone use, hemoptysis, cancer.  Denies unilateral leg swelling.  Denies cardiopulmonary history in the past.  Denies history of heart attacks or strokes in the past.  Denies history of hypertension, hypercholesterolemia, diabetes, peripheral vascular disease or cardiac issues at a young age in his family.  States he does have anxiety.  States he was on Zoloft and many of his symptoms had improved with Zoloft but states that he thought he was going to lose his insurance so stopped taking the Zoloft several years ago and states that his anxiety has increased since then.  Denies SI, HI.  Patient denies headache, blurred vision, focal deficits, motor or sensory changes.    ROS - see HPI, below listed is current ROS at time of my eval:  At least 14 systems reviewed, negative other than HPI    No past medical history on file.  No past surgical history on file.  No family history on file.  Social History     Socioeconomic History    Marital status: Single     Spouse name: Not on file    Number of children: Not on file    Years of education: Not on file    Highest education level: Not on file   Occupational History    Not on file   Tobacco Use    Smoking status: Never    Smokeless tobacco: Current   Vaping Use    Vaping Use: Some days    Start date: 1/17/2023    Substances: Nicotine   Substance and Sexual Activity    Alcohol use: Not Currently     Comment: biweekly    Drug use: Yes     Types: Marijuana (Weed)     Comment: occasional    Sexual activity: Not on file   Other Topics Concern    Not on file   Social History Narrative    Not on file     Social Determinants of Health     Financial Resource Strain: Not on file   Food Insecurity: Not on file   Transportation Needs: Not on file   Physical Activity: Not on file   Stress: Not on file   Social Connections: Not on file   Intimate Partner

## 2024-04-24 NOTE — DISCHARGE INSTRUCTIONS
Follow-up with primary care physician as soon as able for reevaluation.  Return to emergency department for worsening chest pain, shortness of breath, Lancer dizziness, fevers or chills, any facial or neck swelling, difficulty swallowing or handling secretions, headache, blurred vision, focal deficits or motor or sensory changes or any new change or worsening symptoms were always here for evaluation never hesitate to return.

## 2024-04-25 LAB
EKG ATRIAL RATE: 73 BPM
EKG DIAGNOSIS: NORMAL
EKG P AXIS: 75 DEGREES
EKG P-R INTERVAL: 152 MS
EKG Q-T INTERVAL: 358 MS
EKG QRS DURATION: 90 MS
EKG QTC CALCULATION (BAZETT): 394 MS
EKG R AXIS: 64 DEGREES
EKG T AXIS: 46 DEGREES
EKG VENTRICULAR RATE: 73 BPM

## 2024-04-25 PROCEDURE — 93010 ELECTROCARDIOGRAM REPORT: CPT | Performed by: INTERNAL MEDICINE
